# Patient Record
Sex: FEMALE | Race: OTHER | Employment: UNEMPLOYED | URBAN - METROPOLITAN AREA
[De-identification: names, ages, dates, MRNs, and addresses within clinical notes are randomized per-mention and may not be internally consistent; named-entity substitution may affect disease eponyms.]

---

## 2020-06-01 ENCOUNTER — TELEPHONE (OUTPATIENT)
Dept: OBSTETRICS AND GYNECOLOGY | Facility: CLINIC | Age: 25
End: 2020-06-01

## 2020-06-01 NOTE — TELEPHONE ENCOUNTER
Returned call via Language line, pt states she would like to schedule a new pt appt for a new pregnancy. Pt prefers a female provider, informed pt we have one female provider that speaks Romanian, ask if she would like to check her schedule first. Pt states she does, pt scheduled for 7/10 at 1-pm with Dr. Brito. Provided alvaro/[facility]ilton number. Patient verbalized understanding.

## 2020-06-01 NOTE — TELEPHONE ENCOUNTER
----- Message from Marcos Ludwig sent at 5/29/2020  2:35 PM CDT -----  Contact: Pt sister-in-law Marisolkarely  Pt sister-in-law Wally called to make an appt for the pt    The pt is self paying and is possibly pregnant. (Pt is applying for Medicaid)    Wally can be reached at [phone] and speaks Urdu